# Patient Record
(demographics unavailable — no encounter records)

---

## 2025-01-14 NOTE — HISTORY OF PRESENT ILLNESS
[FreeTextEntry6] : 17 y.o female presents to health center for immunization update Currently a senior at [employer] No complaints at this time, feels well Ate today while in school  NKDA PMH, anemia, previously took iron  Denies SX

## 2025-01-14 NOTE — DISCUSSION/SUMMARY
[FreeTextEntry1] :  17 y.o. presents to [facility] for immunizations  Immunizations reviewed  Consent obtained  V/S stable  No complaints at this time  MCV given left deltoid, tolerated Counseling/education provided on health maintenance and promotion  Answered all questions and concerns  Screening completed and reviewed  Discussed services within health center  Safe D/C to class

## 2025-02-10 NOTE — DISCUSSION/SUMMARY
[FreeTextEntry1] : 17 y.o female presents to health center with lower back pain  Denies any additional complaints, non-radiating pain  Denies GI/ complaints  Not sexually active  V/S stable NKDA Ate today in school  Motrin PO given, tolerated  Discuss further evaluation if persist/ worsen, verbalized understanding  D/C to class

## 2025-02-10 NOTE — HISTORY OF PRESENT ILLNESS
[FreeTextEntry6] : 17 y.o female presents to health center for lower back pain  A few weeks ago "slipped and twisted the wrong Way," c/o intermittent pain  Denies falling  NKDA Did not take anything today for symptoms

## 2025-02-10 NOTE — REVIEW OF SYSTEMS
[Restriction of Motion] : no restriction of motion [Bone Deformity] : no bone deformity [Swelling of Joint] : no swelling of joint [Changes in Gait] : no changes in gait [Negative] : Genitourinary

## 2025-02-14 NOTE — HISTORY OF PRESENT ILLNESS
[de-identified] : 17 y.o. female here for u preg (negative) and plan b (consent signed).  Contraceptive options reviewed and I though pt was close to starting OCPs but she is still unsure.  Plan B and condoms given.  On no meds.  NKDA.

## 2025-03-28 NOTE — DISCUSSION/SUMMARY
[FreeTextEntry1] : 17 year old female presenting for annual health care maintenance for working papers.  No concerns today. No risk of SCD of SCA. CIR reviewed and consent was given for recommended vaccines. Does not have a dental home. No mental health concerns. HEADSS assessment demonstrated no health risks. Reviewed growth chart and no concerns at this time. Vision screen within normal limits. Discussed recommendations for cramps during menstruation.  Labs: - Lipids and A1C and HIV and syphilis. Collected urine sample for gonorrhea and chlamydia testing. - Immunizations:  Due for flu; provided caregiver consent  - Referrals: Recommended to discuss future dental plans with mother, given her experience in a dental office. - Patient Education: Provided information on sexually transmitted infections and condom use. - Follow-Up: Scheduled a follow-up appointment for next week to review lab results.  Menstrual Cramps: - Recommended NSAIDs for period cramps as per patient's needs.  - CRAFFT:  Administered the CRAFFT screening tool to assess risk for substance use disorders. Reviewed and discussed the screening results with the patient to ensure understanding and address any identified risks - Mental Health:  Conducted a standardized annual depression screening using an approved tool (e.g., PHQ-9) and spent at least 5 - 15 minutes reviewing the results with the patient.  I spent at least 15 minutes providing preventive counseling and/or risk factor reduction interventions, addressing issues such as family problems, diet and exercise, substance abuse, sexual practices, injury prevention, and other health-related behaviors.

## 2025-03-28 NOTE — HISTORY OF PRESENT ILLNESS
[Yes] : Patient goes to dentist yearly [Needs Immunizations] : Needs immunizations [de-identified] : Flu [FreeTextEntry1] : HEADS Assessment : - Home : Patient resides with her mother, grandmother, and siblings, including a twin sister. - Education : Patient is currently in 12th grade. She is considering further studies in forensics. - Eating Habits : Patient did not report any issues with body image - Activities/Employment : The patient is intending to participate in a reading alliance with [employer]. Patient enjoys theater and playing the [place] drum. - Drugs : Patient denies any use of tobacco, alcohol, or illicit substances. - Suicidality : Patient reports no thoughts of self-harm. - Sex : Patient identifies as female and has a boyfriend. Has been sexually active once. Interested in STI testing. No interested in contraception.    - Menstrual History : - Menarche : Onset of menstruation was at 12 or 13 years old. - Cycle Regularity : Menstrual cycles are regular, occurring monthly. - Duration : Period duration varies, typically lasts 4 to 5 days. - Flow : Uses 3PPD. Reports mild cramps on the first day or two of menstruation but typically does not take medication for it.  Sometimes takes Tylenol. - Date of Last Menstrual Period : Last menstrual period occurred in late February or early March.   Past Medical History : Patient has no known past medical conditions. - Past Surgical History : Patient has not undergone any surgical procedures. - Family History : Patient has a family history of diabetes. - Medications : Patient is not taking any medications. - Allergies : Patient has no known allergies.